# Patient Record
Sex: FEMALE | Race: BLACK OR AFRICAN AMERICAN | NOT HISPANIC OR LATINO | URBAN - METROPOLITAN AREA
[De-identification: names, ages, dates, MRNs, and addresses within clinical notes are randomized per-mention and may not be internally consistent; named-entity substitution may affect disease eponyms.]

---

## 2017-05-12 ENCOUNTER — EMERGENCY (EMERGENCY)
Facility: HOSPITAL | Age: 57
LOS: 1 days | Discharge: HOME | End: 2017-05-12
Attending: EMERGENCY MEDICINE | Admitting: EMERGENCY MEDICINE
Payer: COMMERCIAL

## 2017-05-12 VITALS
TEMPERATURE: 98 F | WEIGHT: 139.99 LBS | SYSTOLIC BLOOD PRESSURE: 132 MMHG | HEART RATE: 60 BPM | OXYGEN SATURATION: 100 % | RESPIRATION RATE: 18 BRPM | HEIGHT: 61 IN | DIASTOLIC BLOOD PRESSURE: 76 MMHG

## 2017-05-12 DIAGNOSIS — R10.9 UNSPECIFIED ABDOMINAL PAIN: ICD-10-CM

## 2017-05-12 DIAGNOSIS — N39.0 URINARY TRACT INFECTION, SITE NOT SPECIFIED: ICD-10-CM

## 2017-05-12 DIAGNOSIS — Z91.018 ALLERGY TO OTHER FOODS: ICD-10-CM

## 2017-05-12 DIAGNOSIS — Z98.890 OTHER SPECIFIED POSTPROCEDURAL STATES: ICD-10-CM

## 2017-05-12 DIAGNOSIS — Z90.710 ACQUIRED ABSENCE OF BOTH CERVIX AND UTERUS: ICD-10-CM

## 2017-05-12 DIAGNOSIS — K46.9 UNSPECIFIED ABDOMINAL HERNIA WITHOUT OBSTRUCTION OR GANGRENE: ICD-10-CM

## 2017-05-12 DIAGNOSIS — D25.9 LEIOMYOMA OF UTERUS, UNSPECIFIED: Chronic | ICD-10-CM

## 2017-05-12 LAB
APPEARANCE UR: ABNORMAL
BILIRUB UR-MCNC: NEGATIVE — SIGNIFICANT CHANGE UP
COLOR SPEC: YELLOW — SIGNIFICANT CHANGE UP
DIFF PNL FLD: ABNORMAL
GLUCOSE UR QL: NEGATIVE MG/DL — SIGNIFICANT CHANGE UP
KETONES UR-MCNC: NEGATIVE — SIGNIFICANT CHANGE UP
LEUKOCYTE ESTERASE UR-ACNC: ABNORMAL
NITRITE UR-MCNC: NEGATIVE — SIGNIFICANT CHANGE UP
PH UR: 6 — SIGNIFICANT CHANGE UP (ref 5–8)
PROT UR-MCNC: NEGATIVE MG/DL — SIGNIFICANT CHANGE UP
SP GR SPEC: 1.01 — SIGNIFICANT CHANGE UP (ref 1.01–1.02)
UROBILINOGEN FLD QL: NEGATIVE MG/DL — SIGNIFICANT CHANGE UP

## 2017-05-12 PROCEDURE — 99283 EMERGENCY DEPT VISIT LOW MDM: CPT

## 2017-05-12 PROCEDURE — 74020: CPT | Mod: 26

## 2017-05-12 RX ORDER — FAMOTIDINE 10 MG/ML
20 INJECTION INTRAVENOUS
Qty: 0 | Refills: 0 | Status: DISCONTINUED | OUTPATIENT
Start: 2017-05-12 | End: 2017-05-16

## 2017-05-12 RX ORDER — METOCLOPRAMIDE HCL 10 MG
10 TABLET ORAL ONCE
Qty: 0 | Refills: 0 | Status: COMPLETED | OUTPATIENT
Start: 2017-05-12 | End: 2017-05-12

## 2017-05-12 RX ADMIN — Medication 30 MILLILITER(S): at 21:41

## 2017-05-12 RX ADMIN — FAMOTIDINE 20 MILLIGRAM(S): 10 INJECTION INTRAVENOUS at 21:42

## 2017-05-12 RX ADMIN — Medication 10 MILLIGRAM(S): at 21:42

## 2017-05-12 NOTE — ED STATDOCS - CHPI ED SYMPTOM NEG
no decreased eating/drinking no chills/no hematuria/no vomiting/no palpitations/no decreased eating/drinking/no fever/no nausea

## 2017-05-12 NOTE — ED STATDOCS - PROGRESS NOTE DETAILS
Pt presented to ED with intermittent abdomen pain x 2 days. Pt denies nausea , vomiting , diarrhea or fever. Pt able to tolerate Po intake well and has normal bowel movement. Examination + normal BS in all quadrant , No tenderness on abdominal palpation. No guarding or organomegaly. Pt states that she feels a lot better after been medicated with Ibuprofen. Abdominal x-ray negative for air fluids level . + fecal matter noted in bowels. UA + urinary tract infection . Pt medicated with Microbid in ED and culture sent. Pt D/C in stable condition with Rx Microbid. F/u with PCP.

## 2017-05-12 NOTE — ED STATDOCS - OBJECTIVE STATEMENT
57 y/o F pt with PMHx of fibroids and PSHx of myomectomy and hysterectomy presents to ED c/o intermittent abdominal pain x2 days. Pain worsens with coughing. She has had similar sx in the past. She reports she had a myomectomy and hysterectomy 10 years ago and "feels like something opened inside". Pt denies CP, SOB, fever, nausea, decreased PO intake, vomiting, constipation, dysuria, and hematuria. No further complaints at this time. NKDA.

## 2017-05-12 NOTE — ED STATDOCS - NS ED MD SCRIBE ATTENDING SCRIBE SECTIONS
HISTORY OF PRESENT ILLNESS/PAST MEDICAL/SURGICAL/SOCIAL HISTORY/HIV/DISPOSITION/VITAL SIGNS( Pullset)/REVIEW OF SYSTEMS/PHYSICAL EXAM

## 2017-05-12 NOTE — ED STATDOCS - ATTENDING CONTRIBUTION TO CARE
I, Maciel Desai, performed the initial face to face bedside interview with this patient regarding history of present illness, review of symptoms and relevant past medical, social and family history.  I completed an independent physical examination.  I was the initial provider who evaluated this patient. I have signed out the follow up of any pending tests (i.e. labs, radiological studies) to the ACP.  I have communicated the patient’s plan of care and disposition with the ACP.  The history, relevant review of systems, past medical and surgical history, medical decision making, and physical examination was documented by the scribe in my presence and I attest to the accuracy of the documentation.

## 2017-05-12 NOTE — ED STATDOCS - CARE PLAN
Principal Discharge DX:	Urinary tract infection  Instructions for follow-up, activity and diet:	Continue with medication as prescribed and F/u with Surgical clinic  Secondary Diagnosis:	Hernia, abdominal

## 2017-05-12 NOTE — ED STATDOCS - MEDICAL DECISION MAKING DETAILS
Poor historian. Hx changes on repeat questioning. Prior abd surgery. Procedure unclear. Belly soft and tolerates PO. Reducible incisional hernia? AXR to eval for air fluid levels suggesting obstruction, UA for UTI. Tx symptomatically, check results, and reassess. Surgery f/u on discharge if improved on reassessment.

## 2017-05-13 LAB
BACTERIA # UR AUTO: ABNORMAL
EPI CELLS # UR: ABNORMAL
RBC CASTS # UR COMP ASSIST: ABNORMAL /HPF (ref 0–4)
WBC UR QL: ABNORMAL

## 2017-05-13 PROCEDURE — 74020: CPT

## 2017-05-13 PROCEDURE — 81001 URINALYSIS AUTO W/SCOPE: CPT

## 2017-05-13 PROCEDURE — 99283 EMERGENCY DEPT VISIT LOW MDM: CPT | Mod: 25

## 2017-05-13 RX ORDER — NITROFURANTOIN MACROCRYSTAL 50 MG
1 CAPSULE ORAL
Qty: 20 | Refills: 0 | OUTPATIENT
Start: 2017-05-13 | End: 2017-05-23

## 2017-05-13 RX ORDER — IBUPROFEN 200 MG
600 TABLET ORAL ONCE
Qty: 0 | Refills: 0 | Status: COMPLETED | OUTPATIENT
Start: 2017-05-13 | End: 2017-05-13

## 2017-05-13 RX ORDER — NITROFURANTOIN MACROCRYSTAL 50 MG
100 CAPSULE ORAL ONCE
Qty: 0 | Refills: 0 | Status: COMPLETED | OUTPATIENT
Start: 2017-05-13 | End: 2017-05-13

## 2017-05-13 RX ADMIN — Medication 600 MILLIGRAM(S): at 01:02

## 2017-05-13 RX ADMIN — Medication 600 MILLIGRAM(S): at 00:28

## 2017-05-13 RX ADMIN — Medication 100 MILLIGRAM(S): at 01:05

## 2021-02-10 ENCOUNTER — EMERGENCY (EMERGENCY)
Facility: HOSPITAL | Age: 61
LOS: 1 days | Discharge: HOME | End: 2021-02-10
Payer: COMMERCIAL

## 2021-02-10 VITALS
DIASTOLIC BLOOD PRESSURE: 86 MMHG | OXYGEN SATURATION: 99 % | HEART RATE: 80 BPM | RESPIRATION RATE: 18 BRPM | HEIGHT: 67 IN | TEMPERATURE: 98 F | SYSTOLIC BLOOD PRESSURE: 143 MMHG | WEIGHT: 139.99 LBS

## 2021-02-10 DIAGNOSIS — D25.9 LEIOMYOMA OF UTERUS, UNSPECIFIED: Chronic | ICD-10-CM

## 2021-02-10 LAB — SARS-COV-2 RNA SPEC QL NAA+PROBE: DETECTED

## 2021-02-10 PROCEDURE — U0005: CPT

## 2021-02-10 PROCEDURE — 99282 EMERGENCY DEPT VISIT SF MDM: CPT

## 2021-02-10 PROCEDURE — 99283 EMERGENCY DEPT VISIT LOW MDM: CPT

## 2021-02-10 PROCEDURE — U0003: CPT

## 2021-02-10 NOTE — ED PROVIDER NOTE - CLINICAL SUMMARY MEDICAL DECISION MAKING FREE TEXT BOX
Pt nontoxic appearing, stable vitals, ambulatory with stable saturation without supplemental oxygen. PT does not meet criteria listed in most updated guidelines as per [facility] protocol/algorithm for admission at this time. pt advised about self-quarantine instructions until negative test results and/or symptom resolution. pt advised on hand hygiene, monitoring of symptoms, antipyretic use as well as and fu with primary care provider. Instructions given in pre-printed copy. COVID-19 PCR sent. Pt given strict return precautions.

## 2021-02-10 NOTE — ED PROVIDER NOTE - PATIENT PORTAL LINK FT
You can access the FollowMyHealth Patient Portal offered by [facility] by registering at the following website: [email or web address]. By joining [facility]’s FollowMyHealth portal, you will also be able to view your health information using other applications (apps) compatible with our system.

## 2021-02-10 NOTE — ED PROVIDER NOTE - OBJECTIVE STATEMENT
Pt presenting to the ER for COVID-19 testing. Denies fevers chills, loss of taste or smell, URI symptoms, chest pain or shortness of breath, nausea vomiting diarrhea abdominal pain, weakness or fatigue. Eating and drinking normal diet. Normal output. Pt requesting testing at this time. [] known exposure [] no-known Pt presenting to the ER for COVID-19 testing. Pt reports needing to be tested prior to returning to work. Pt has no symptoms at this time. Denies fevers chills, loss of taste or smell, URI symptoms, chest pain or shortness of breath.

## 2021-02-22 ENCOUNTER — EMERGENCY (EMERGENCY)
Facility: HOSPITAL | Age: 61
LOS: 1 days | Discharge: HOME | End: 2021-02-22
Payer: COMMERCIAL

## 2021-02-22 VITALS
RESPIRATION RATE: 20 BRPM | SYSTOLIC BLOOD PRESSURE: 138 MMHG | DIASTOLIC BLOOD PRESSURE: 89 MMHG | TEMPERATURE: 98 F | OXYGEN SATURATION: 100 % | HEIGHT: 67 IN | HEART RATE: 72 BPM

## 2021-02-22 DIAGNOSIS — D25.9 LEIOMYOMA OF UTERUS, UNSPECIFIED: Chronic | ICD-10-CM

## 2021-02-22 LAB — SARS-COV-2 RNA SPEC QL NAA+PROBE: SIGNIFICANT CHANGE UP

## 2021-02-22 PROCEDURE — 99283 EMERGENCY DEPT VISIT LOW MDM: CPT

## 2021-02-22 PROCEDURE — U0003: CPT

## 2021-02-22 PROCEDURE — 99282 EMERGENCY DEPT VISIT SF MDM: CPT

## 2021-02-22 PROCEDURE — U0005: CPT

## 2021-02-22 NOTE — ED PROVIDER NOTE - PATIENT PORTAL LINK FT
You can access the FollowMyHealth Patient Portal offered by [facility] by registering at the following website: [email or web address]. By joining [employer]’s FollowMyHealth portal, you will also be able to view your health information using other applications (apps) compatible with our system.

## 2021-02-22 NOTE — ED PROVIDER NOTE - OBJECTIVE STATEMENT
Pt presenting to the ER for COVID-19 testing. Pt reports + COVID-19 contact and needs to be tested at this time. Pt has no symptoms or complaints.

## 2021-02-22 NOTE — ED PROVIDER NOTE - CLINICAL SUMMARY MEDICAL DECISION MAKING FREE TEXT BOX
Pt nontoxic appearing, stable vitals, ambulatory with stable saturation without supplemental oxygen. PT does not meet criteria listed in most updated guidelines as per [facility] protocol/algorithm for admission at this time. pt advised about self-quarantine instructions until negative test results and/or symptom resolution. pt advised on hand hygiene, monitoring of symptoms, antipyretic use as well as and fu with primary care provider. Instructions given in pre-printed copy. COVID-19 PCR sent. Pt given strict return instructions.

## 2023-04-28 ENCOUNTER — OFFICE (OUTPATIENT)
Dept: URBAN - METROPOLITAN AREA CLINIC 112 | Facility: CLINIC | Age: 63
Setting detail: OPHTHALMOLOGY
End: 2023-04-28
Payer: COMMERCIAL

## 2023-04-28 DIAGNOSIS — H40.1113: ICD-10-CM

## 2023-04-28 DIAGNOSIS — H40.1123: ICD-10-CM

## 2023-04-28 PROCEDURE — 99214 OFFICE O/P EST MOD 30 MIN: CPT | Performed by: OPHTHALMOLOGY

## 2023-04-28 PROCEDURE — 92133 CPTRZD OPH DX IMG PST SGM ON: CPT | Performed by: OPHTHALMOLOGY

## 2023-04-28 ASSESSMENT — REFRACTION_CURRENTRX
OS_CYLINDER: -0.75
OS_VPRISM_DIRECTION: PROGS
OD_OVR_VA: 20/
OS_VPRISM_DIRECTION: PROGS
OD_ADD: +2.50
OS_ADD: +2.50
OS_ADD: +2.75
OD_VPRISM_DIRECTION: PROGS
OS_CYLINDER: -0.75
OD_CYLINDER: -0.25
OS_OVR_VA: 20/
OS_SPHERE: +3.25
OD_OVR_VA: 20/
OD_SPHERE: +2.50
OS_OVR_VA: 20/
OS_AXIS: 082
OD_ADD: +2.75
OD_AXIS: 071
OS_SPHERE: +3.25
OS_AXIS: 078
OD_VPRISM_DIRECTION: PROGS
OD_SPHERE: +2.75
OD_CYLINDER: 0.00
OD_AXIS: 000

## 2023-04-28 ASSESSMENT — VISUAL ACUITY
OS_BCVA: 20/40-1
OD_BCVA: 20/20-1

## 2023-04-28 ASSESSMENT — CONFRONTATIONAL VISUAL FIELD TEST (CVF)
OD_FINDINGS: FULL
OS_FINDINGS: FULL

## 2023-04-28 ASSESSMENT — REFRACTION_MANIFEST
OD_ADD: +1.75
OD_SPHERE: +0.25
OS_SPHERE: +1.75
OD_VA1: 20/50
OD_AXIS: 061
OS_VA1: 20/40
OS_ADD: +1.75
OD_SPHERE: +2.50
OS_AXIS: 081
OD_CYLINDER: -0.50
OD_VA1: 20/25
OS_CYLINDER: -1.00
OS_ADD: +1.75
OS_VA1: 20/25
OS_SPHERE: +2.50
OD_ADD: +1.75

## 2023-04-28 ASSESSMENT — KERATOMETRY
OS_AXISANGLE_DEGREES: 011
OD_AXISANGLE_DEGREES: 132
METHOD_AUTO_MANUAL: AUTO
OS_K2POWER_DIOPTERS: 41.50
OS_K1POWER_DIOPTERS: 41.25
OD_K1POWER_DIOPTERS: 41.25
OD_K2POWER_DIOPTERS: 41.50

## 2023-04-28 ASSESSMENT — PACHYMETRY
OS_CT_UM: 508
OD_CT_UM: 532
OD_CT_CORRECTION: 1
OS_CT_CORRECTION: 3

## 2023-04-28 ASSESSMENT — AXIALLENGTH_DERIVED
OD_AL: 24.5038
OS_AL: 24.3472
OS_AL: 23.8891
OD_AL: 24.3991

## 2023-04-28 ASSESSMENT — REFRACTION_AUTOREFRACTION
OS_CYLINDER: -0.75
OD_CYLINDER: -1.00
OD_SPHERE: +0.25
OD_AXIS: 080
OS_SPHERE: +0.50
OS_AXIS: 100

## 2023-04-28 ASSESSMENT — SPHEQUIV_DERIVED
OS_SPHEQUIV: 0.125
OD_SPHEQUIV: 0
OD_SPHEQUIV: -0.25
OS_SPHEQUIV: 1.25

## 2023-04-28 ASSESSMENT — TONOMETRY
OD_IOP_MMHG: 17
OS_IOP_MMHG: 17

## 2023-05-26 ENCOUNTER — OFFICE (OUTPATIENT)
Dept: URBAN - METROPOLITAN AREA CLINIC 112 | Facility: CLINIC | Age: 63
Setting detail: OPHTHALMOLOGY
End: 2023-05-26
Payer: COMMERCIAL

## 2023-05-26 ENCOUNTER — RX ONLY (RX ONLY)
Age: 63
End: 2023-05-26

## 2023-05-26 DIAGNOSIS — Z96.1: ICD-10-CM

## 2023-05-26 DIAGNOSIS — H40.1123: ICD-10-CM

## 2023-05-26 DIAGNOSIS — H26.493: ICD-10-CM

## 2023-05-26 DIAGNOSIS — H40.1113: ICD-10-CM

## 2023-05-26 PROCEDURE — 92012 INTRM OPH EXAM EST PATIENT: CPT | Performed by: OPHTHALMOLOGY

## 2023-05-26 PROCEDURE — 92250 FUNDUS PHOTOGRAPHY W/I&R: CPT | Performed by: OPHTHALMOLOGY

## 2023-05-26 ASSESSMENT — PACHYMETRY
OS_CT_CORRECTION: 3
OD_CT_UM: 532
OS_CT_UM: 508
OD_CT_CORRECTION: 1

## 2023-05-26 ASSESSMENT — VISUAL ACUITY
OS_BCVA: 20/40+1
OD_BCVA: 20/25

## 2023-05-26 ASSESSMENT — REFRACTION_MANIFEST
OD_SPHERE: +2.50
OS_ADD: +1.75
OD_VA1: 20/25
OS_SPHERE: +2.50
OS_VA1: 20/40
OD_AXIS: 061
OS_ADD: +1.75
OS_CYLINDER: -1.00
OS_SPHERE: +1.75
OD_VA1: 20/50
OD_CYLINDER: -0.50
OD_ADD: +1.75
OD_ADD: +1.75
OS_VA1: 20/25
OS_AXIS: 081
OD_SPHERE: +0.25

## 2023-05-26 ASSESSMENT — REFRACTION_CURRENTRX
OS_VPRISM_DIRECTION: PROGS
OD_CYLINDER: 0.00
OS_SPHERE: +3.25
OD_CYLINDER: -0.25
OD_SPHERE: +2.50
OS_CYLINDER: -0.75
OD_OVR_VA: 20/
OS_AXIS: 082
OD_SPHERE: +2.75
OS_OVR_VA: 20/
OD_OVR_VA: 20/
OS_ADD: +2.75
OS_CYLINDER: -0.75
OD_AXIS: 071
OD_VPRISM_DIRECTION: PROGS
OS_AXIS: 078
OD_AXIS: 000
OD_ADD: +2.50
OS_OVR_VA: 20/
OS_VPRISM_DIRECTION: PROGS
OD_VPRISM_DIRECTION: PROGS
OS_ADD: +2.50
OS_SPHERE: +3.25
OD_ADD: +2.75

## 2023-05-26 ASSESSMENT — AXIALLENGTH_DERIVED
OS_AL: 24.3472
OS_AL: 23.8891
OD_AL: 24.5038
OD_AL: 24.3991

## 2023-05-26 ASSESSMENT — REFRACTION_AUTOREFRACTION
OS_CYLINDER: -0.75
OS_SPHERE: +0.50
OD_AXIS: 080
OD_CYLINDER: -1.00
OS_AXIS: 100
OD_SPHERE: +0.25

## 2023-05-26 ASSESSMENT — KERATOMETRY
METHOD_AUTO_MANUAL: AUTO
OS_AXISANGLE_DEGREES: 011
OD_AXISANGLE_DEGREES: 132
OD_K2POWER_DIOPTERS: 41.50
OD_K1POWER_DIOPTERS: 41.25
OS_K1POWER_DIOPTERS: 41.25
OS_K2POWER_DIOPTERS: 41.50

## 2023-05-26 ASSESSMENT — SPHEQUIV_DERIVED
OD_SPHEQUIV: 0
OD_SPHEQUIV: -0.25
OS_SPHEQUIV: 0.125
OS_SPHEQUIV: 1.25

## 2023-05-26 ASSESSMENT — CONFRONTATIONAL VISUAL FIELD TEST (CVF)
OS_FINDINGS: FULL
OD_FINDINGS: FULL

## 2023-05-26 ASSESSMENT — TONOMETRY
OD_IOP_MMHG: 12
OS_IOP_MMHG: 12

## 2023-07-20 NOTE — ED ADULT NURSE NOTE - PATIENT DISCHARGE SIGNATURE
13-May-2017 Cephalexin Pregnancy And Lactation Text: This medication is Pregnancy Category B and considered safe during pregnancy.  It is also excreted in breast milk but can be used safely for shorter doses.

## 2023-09-08 ENCOUNTER — OFFICE (OUTPATIENT)
Dept: URBAN - METROPOLITAN AREA CLINIC 112 | Facility: CLINIC | Age: 63
Setting detail: OPHTHALMOLOGY
End: 2023-09-08
Payer: COMMERCIAL

## 2023-09-08 DIAGNOSIS — H40.1123: ICD-10-CM

## 2023-09-08 DIAGNOSIS — Z96.1: ICD-10-CM

## 2023-09-08 DIAGNOSIS — H26.493: ICD-10-CM

## 2023-09-08 DIAGNOSIS — H40.1113: ICD-10-CM

## 2023-09-08 PROCEDURE — 92083 EXTENDED VISUAL FIELD XM: CPT | Performed by: OPHTHALMOLOGY

## 2023-09-08 PROCEDURE — 92020 GONIOSCOPY: CPT | Performed by: OPHTHALMOLOGY

## 2023-09-08 PROCEDURE — 92014 COMPRE OPH EXAM EST PT 1/>: CPT | Performed by: OPHTHALMOLOGY

## 2023-09-08 ASSESSMENT — REFRACTION_CURRENTRX
OS_AXIS: 078
OD_AXIS: 000
OD_CYLINDER: 0.00
OD_ADD: +2.50
OD_AXIS: 071
OD_CYLINDER: -0.25
OD_VPRISM_DIRECTION: PROGS
OS_VPRISM_DIRECTION: PROGS
OS_CYLINDER: -0.75
OS_OVR_VA: 20/
OS_VPRISM_DIRECTION: PROGS
OD_ADD: +2.75
OD_VPRISM_DIRECTION: PROGS
OS_ADD: +2.75
OD_OVR_VA: 20/
OS_AXIS: 082
OS_SPHERE: +3.25
OD_SPHERE: +2.50
OS_ADD: +2.50
OD_SPHERE: +2.75
OS_SPHERE: +3.25
OD_OVR_VA: 20/
OS_OVR_VA: 20/
OS_CYLINDER: -0.75

## 2023-09-08 ASSESSMENT — REFRACTION_AUTOREFRACTION
OD_AXIS: 080
OD_SPHERE: +0.25
OD_CYLINDER: -1.00
OS_CYLINDER: -0.75
OS_SPHERE: +0.50
OS_AXIS: 100

## 2023-09-08 ASSESSMENT — PACHYMETRY
OD_CT_UM: 532
OS_CT_CORRECTION: 3
OS_CT_UM: 508
OD_CT_CORRECTION: 1

## 2023-09-08 ASSESSMENT — REFRACTION_MANIFEST
OD_SPHERE: +2.50
OS_SPHERE: +1.75
OD_VA1: 20/50
OD_AXIS: 061
OD_SPHERE: +0.25
OS_AXIS: 081
OD_ADD: +1.75
OS_ADD: +1.75
OD_ADD: +1.75
OS_VA1: 20/25
OS_ADD: +1.75
OS_CYLINDER: -1.00
OD_VA1: 20/25
OS_SPHERE: +2.50
OD_CYLINDER: -0.50
OS_VA1: 20/40

## 2023-09-08 ASSESSMENT — KERATOMETRY
OD_K1POWER_DIOPTERS: 41.25
OS_AXISANGLE_DEGREES: 011
OS_K2POWER_DIOPTERS: 41.50
OD_AXISANGLE_DEGREES: 132
OS_K1POWER_DIOPTERS: 41.25
OD_K2POWER_DIOPTERS: 41.50
METHOD_AUTO_MANUAL: AUTO

## 2023-09-08 ASSESSMENT — VISUAL ACUITY
OS_BCVA: 20/50-
OD_BCVA: 20/30

## 2023-09-08 ASSESSMENT — SPHEQUIV_DERIVED
OS_SPHEQUIV: 1.25
OD_SPHEQUIV: -0.25
OS_SPHEQUIV: 0.125
OD_SPHEQUIV: 0

## 2023-09-08 ASSESSMENT — AXIALLENGTH_DERIVED
OD_AL: 24.3991
OS_AL: 24.3472
OS_AL: 23.8891
OD_AL: 24.5038

## 2023-09-08 ASSESSMENT — TONOMETRY
OD_IOP_MMHG: 14
OS_IOP_MMHG: 14

## 2023-09-08 ASSESSMENT — CONFRONTATIONAL VISUAL FIELD TEST (CVF)
OS_FINDINGS: FULL
OD_FINDINGS: FULL

## 2024-01-12 ENCOUNTER — OFFICE (OUTPATIENT)
Dept: URBAN - METROPOLITAN AREA CLINIC 112 | Facility: CLINIC | Age: 64
Setting detail: OPHTHALMOLOGY
End: 2024-01-12
Payer: MEDICARE

## 2024-01-12 DIAGNOSIS — H40.1113: ICD-10-CM

## 2024-01-12 DIAGNOSIS — Z96.1: ICD-10-CM

## 2024-01-12 DIAGNOSIS — H40.1123: ICD-10-CM

## 2024-01-12 DIAGNOSIS — H26.493: ICD-10-CM

## 2024-01-12 PROCEDURE — 92133 CPTRZD OPH DX IMG PST SGM ON: CPT | Performed by: OPHTHALMOLOGY

## 2024-01-12 PROCEDURE — 99213 OFFICE O/P EST LOW 20 MIN: CPT | Performed by: OPHTHALMOLOGY

## 2024-01-12 ASSESSMENT — REFRACTION_CURRENTRX
OS_VPRISM_DIRECTION: PROGS
OS_ADD: +2.75
OS_ADD: +2.50
OS_OVR_VA: 20/
OS_CYLINDER: -0.75
OS_SPHERE: +3.25
OD_ADD: +2.50
OD_AXIS: 071
OD_AXIS: 000
OD_OVR_VA: 20/
OS_OVR_VA: 20/
OS_AXIS: 078
OS_AXIS: 082
OS_CYLINDER: -0.75
OD_SPHERE: +2.50
OD_ADD: +2.75
OD_VPRISM_DIRECTION: PROGS
OD_SPHERE: +2.75
OD_CYLINDER: 0.00
OS_SPHERE: +3.25
OD_CYLINDER: -0.25
OS_VPRISM_DIRECTION: PROGS
OD_VPRISM_DIRECTION: PROGS
OD_OVR_VA: 20/

## 2024-01-12 ASSESSMENT — REFRACTION_MANIFEST
OS_VA1: 20/40
OD_VA1: 20/25
OD_ADD: +1.75
OS_ADD: +1.75
OD_SPHERE: +2.50
OS_SPHERE: +2.50
OD_CYLINDER: -0.50
OS_ADD: +1.75
OD_ADD: +1.75
OS_VA1: 20/25
OS_SPHERE: +1.75
OD_VA1: 20/50
OS_CYLINDER: -1.00
OD_SPHERE: +0.25
OS_AXIS: 081
OD_AXIS: 061

## 2024-01-12 ASSESSMENT — REFRACTION_AUTOREFRACTION
OS_SPHERE: +0.25
OD_AXIS: 070
OD_SPHERE: +0.25
OS_AXIS: 108
OS_CYLINDER: -0.75
OD_CYLINDER: -0.75

## 2024-01-12 ASSESSMENT — SPHEQUIV_DERIVED
OS_SPHEQUIV: 1.25
OS_SPHEQUIV: -0.125
OD_SPHEQUIV: 0
OD_SPHEQUIV: -0.125

## 2024-01-12 ASSESSMENT — CONFRONTATIONAL VISUAL FIELD TEST (CVF)
OS_FINDINGS: FULL
OD_FINDINGS: FULL

## 2024-05-06 ENCOUNTER — OFFICE (OUTPATIENT)
Dept: URBAN - METROPOLITAN AREA CLINIC 112 | Facility: CLINIC | Age: 64
Setting detail: OPHTHALMOLOGY
End: 2024-05-06
Payer: MEDICARE

## 2024-05-06 DIAGNOSIS — H40.1123: ICD-10-CM

## 2024-05-06 DIAGNOSIS — H40.1113: ICD-10-CM

## 2024-05-06 DIAGNOSIS — H26.493: ICD-10-CM

## 2024-05-06 DIAGNOSIS — Z96.1: ICD-10-CM

## 2024-05-06 PROBLEM — H26.492 POSTERIOR CAPSULE OPACITY; RIGHT EYE, LEFT EYE, BOTH EYES: Status: ACTIVE | Noted: 2024-05-06

## 2024-05-06 PROBLEM — H26.491 POSTERIOR CAPSULE OPACITY; RIGHT EYE, LEFT EYE, BOTH EYES: Status: ACTIVE | Noted: 2024-05-06

## 2024-05-06 PROCEDURE — 92014 COMPRE OPH EXAM EST PT 1/>: CPT | Performed by: OPHTHALMOLOGY

## 2024-05-06 PROCEDURE — 92250 FUNDUS PHOTOGRAPHY W/I&R: CPT | Performed by: OPHTHALMOLOGY

## 2024-05-06 ASSESSMENT — CONFRONTATIONAL VISUAL FIELD TEST (CVF)
OS_FINDINGS: FULL
OD_FINDINGS: FULL

## 2024-06-19 ENCOUNTER — ASC (OUTPATIENT)
Dept: URBAN - METROPOLITAN AREA SURGERY 8 | Facility: SURGERY | Age: 64
Setting detail: OPHTHALMOLOGY
End: 2024-06-19
Payer: MEDICARE

## 2024-06-19 DIAGNOSIS — H26.491: ICD-10-CM

## 2024-06-19 PROCEDURE — 66821 AFTER CATARACT LASER SURGERY: CPT | Mod: RT | Performed by: OPHTHALMOLOGY

## 2024-06-21 ENCOUNTER — ASC (OUTPATIENT)
Dept: URBAN - METROPOLITAN AREA SURGERY 8 | Facility: SURGERY | Age: 64
Setting detail: OPHTHALMOLOGY
End: 2024-06-21
Payer: MEDICARE

## 2024-06-21 DIAGNOSIS — H26.492: ICD-10-CM

## 2024-06-21 PROBLEM — H26.491 POSTERIOR CAPSULE OPACITY; RIGHT EYE, LEFT EYE: Status: ACTIVE | Noted: 2024-06-21

## 2024-06-21 PROCEDURE — 66821 AFTER CATARACT LASER SURGERY: CPT | Mod: 79,LT | Performed by: OPHTHALMOLOGY

## 2024-08-02 ENCOUNTER — OFFICE (OUTPATIENT)
Dept: URBAN - METROPOLITAN AREA CLINIC 112 | Facility: CLINIC | Age: 64
Setting detail: OPHTHALMOLOGY
End: 2024-08-02
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
## 2024-08-02 PROCEDURE — NO SHOW FE NO SHOW FEE: Performed by: OPHTHALMOLOGY

## 2024-09-28 ENCOUNTER — OFFICE (OUTPATIENT)
Dept: URBAN - METROPOLITAN AREA CLINIC 94 | Facility: CLINIC | Age: 64
Setting detail: OPHTHALMOLOGY
End: 2024-09-28
Payer: MEDICARE

## 2024-09-28 DIAGNOSIS — H40.1113: ICD-10-CM

## 2024-09-28 DIAGNOSIS — H40.1123: ICD-10-CM

## 2024-09-28 DIAGNOSIS — Z96.1: ICD-10-CM

## 2024-09-28 PROCEDURE — 99024 POSTOP FOLLOW-UP VISIT: CPT | Performed by: OPHTHALMOLOGY

## 2024-09-28 ASSESSMENT — CONFRONTATIONAL VISUAL FIELD TEST (CVF)
OS_FINDINGS: FULL
OD_FINDINGS: FULL

## 2024-12-14 ENCOUNTER — OFFICE (OUTPATIENT)
Dept: URBAN - METROPOLITAN AREA CLINIC 40 | Facility: CLINIC | Age: 64
Setting detail: OPHTHALMOLOGY
End: 2024-12-14
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
## 2024-12-14 PROCEDURE — NO SHOW FE NO SHOW FEE: Performed by: OPHTHALMOLOGY

## 2025-06-20 ENCOUNTER — OFFICE (OUTPATIENT)
Dept: URBAN - METROPOLITAN AREA CLINIC 94 | Facility: CLINIC | Age: 65
Setting detail: OPHTHALMOLOGY
End: 2025-06-20
Payer: MEDICARE

## 2025-06-20 DIAGNOSIS — H40.1123: ICD-10-CM

## 2025-06-20 DIAGNOSIS — Z96.1: ICD-10-CM

## 2025-06-20 DIAGNOSIS — H35.3132: ICD-10-CM

## 2025-06-20 DIAGNOSIS — H40.1113: ICD-10-CM

## 2025-06-20 PROCEDURE — 92014 COMPRE OPH EXAM EST PT 1/>: CPT | Performed by: OPHTHALMOLOGY

## 2025-06-20 PROCEDURE — 92250 FUNDUS PHOTOGRAPHY W/I&R: CPT | Performed by: OPHTHALMOLOGY

## 2025-06-20 ASSESSMENT — REFRACTION_MANIFEST
OS_CYLINDER: -1.00
OD_ADD: +1.75
OS_VA1: 20/25
OS_SPHERE: +1.75
OS_ADD: +1.75
OD_AXIS: 061
OD_ADD: +1.75
OD_CYLINDER: -0.50
OS_SPHERE: +2.50
OD_VA1: 20/25
OD_SPHERE: +2.50
OS_AXIS: 081
OS_VA1: 20/30
OS_ADD: +1.75
OD_VA1: 20/40
OD_SPHERE: +0.25

## 2025-06-20 ASSESSMENT — TONOMETRY
OS_IOP_MMHG: 12
OD_IOP_MMHG: 12
OD_IOP_MMHG: 18
OS_IOP_MMHG: 16

## 2025-06-20 ASSESSMENT — REFRACTION_CURRENTRX
OD_ADD: +2.50
OD_VPRISM_DIRECTION: PROGS
OS_ADD: +2.75
OS_SPHERE: +3.25
OS_SPHERE: +3.25
OS_VPRISM_DIRECTION: PROGS
OS_AXIS: 078
OD_VPRISM_DIRECTION: PROGS
OS_ADD: +2.50
OD_CYLINDER: -0.25
OS_CYLINDER: -0.75
OD_AXIS: 000
OD_SPHERE: +2.75
OD_OVR_VA: 20/
OD_OVR_VA: 20/
OS_CYLINDER: -0.75
OD_ADD: +2.75
OS_AXIS: 082
OD_AXIS: 071
OS_OVR_VA: 20/
OD_SPHERE: +2.50
OD_CYLINDER: 0.00
OS_OVR_VA: 20/
OS_VPRISM_DIRECTION: PROGS

## 2025-06-20 ASSESSMENT — CONFRONTATIONAL VISUAL FIELD TEST (CVF)
OD_FINDINGS: FULL
OS_FINDINGS: FULL

## 2025-06-20 ASSESSMENT — KERATOMETRY
OD_AXISANGLE_DEGREES: 135
METHOD_AUTO_MANUAL: AUTO
OS_K2POWER_DIOPTERS: 41.50
OS_K1POWER_DIOPTERS: 41.00
OD_K1POWER_DIOPTERS: 41.00
OS_AXISANGLE_DEGREES: 175
OD_K2POWER_DIOPTERS: 1.50

## 2025-06-20 ASSESSMENT — REFRACTION_AUTOREFRACTION
OS_AXIS: 090
OD_SPHERE: +0.50
OD_AXIS: 075
OD_CYLINDER: -1.00
OS_CYLINDER: -1.00
OS_SPHERE: +0.75

## 2025-06-20 ASSESSMENT — VISUAL ACUITY
OS_BCVA: 20/40
OD_BCVA: 20/25-1

## 2025-06-20 ASSESSMENT — PACHYMETRY
OD_CT_UM: 532
OD_CT_CORRECTION: 1
OS_CT_CORRECTION: 3
OS_CT_UM: 508